# Patient Record
Sex: FEMALE | Race: WHITE
[De-identification: names, ages, dates, MRNs, and addresses within clinical notes are randomized per-mention and may not be internally consistent; named-entity substitution may affect disease eponyms.]

---

## 2018-12-31 ENCOUNTER — HOSPITAL ENCOUNTER (INPATIENT)
Dept: HOSPITAL 92 - L&D | Age: 23
LOS: 2 days | Discharge: HOME | End: 2019-01-02
Attending: OBSTETRICS & GYNECOLOGY | Admitting: OBSTETRICS & GYNECOLOGY
Payer: COMMERCIAL

## 2018-12-31 VITALS — BODY MASS INDEX: 24.1 KG/M2

## 2018-12-31 DIAGNOSIS — Z3A.40: ICD-10-CM

## 2018-12-31 DIAGNOSIS — O48.0: ICD-10-CM

## 2018-12-31 LAB
HBSAG INDEX: 0.19 S/CO (ref 0–0.99)
HGB BLD-MCNC: 13.3 G/DL (ref 12–16)
MCH RBC QN AUTO: 29.7 PG (ref 27–31)
MCV RBC AUTO: 85.4 FL (ref 78–98)
PLATELET # BLD AUTO: 194 THOU/UL (ref 130–400)
RBC # BLD AUTO: 4.46 MILL/UL (ref 4.2–5.4)
WBC # BLD AUTO: 8.1 THOU/UL (ref 4.8–10.8)

## 2018-12-31 PROCEDURE — 87340 HEPATITIS B SURFACE AG IA: CPT

## 2018-12-31 PROCEDURE — 86850 RBC ANTIBODY SCREEN: CPT

## 2018-12-31 PROCEDURE — 36415 COLL VENOUS BLD VENIPUNCTURE: CPT

## 2018-12-31 PROCEDURE — 85027 COMPLETE CBC AUTOMATED: CPT

## 2018-12-31 PROCEDURE — 51702 INSERT TEMP BLADDER CATH: CPT

## 2018-12-31 PROCEDURE — 86901 BLOOD TYPING SEROLOGIC RH(D): CPT

## 2018-12-31 PROCEDURE — 86900 BLOOD TYPING SEROLOGIC ABO: CPT

## 2018-12-31 RX ADMIN — SIMETHICONE PRN MG: 80 TABLET, CHEWABLE ORAL at 20:53

## 2018-12-31 RX ADMIN — Medication SCH: at 22:56

## 2018-12-31 RX ADMIN — DOCUSATE CALCIUM SCH MG: 240 CAPSULE, LIQUID FILLED ORAL at 20:53

## 2018-12-31 NOTE — OP
DATE OF PROCEDURE:  2018



PREOPERATIVE DIAGNOSES:  

1. A 23-year-old G2, P1 at 40 weeks and 2 days.

2. Breech presentation.

3. Declined external cephalic version.



POSTOPERATIVE DIAGNOSIS:  Status post primary low transverse  section.



PROCEDURE PERFORMED:  Primary low transverse  section.



ASSISTANT:  Dr. Arndt.



ANESTHESIA:  Spinal.



COMPLICATIONS:  None.



ESTIMATED BLOOD LOSS:  600 mL.



QUANTITATIVE BLOOD LOSS:  Pending.



FINDINGS:  

1. Female infant delivered from complete breech presentation.  Apgars pending 
at the

time of dictation.  Weight 7 pounds 5 ounces, to  Nursery. 

2. Low transverse hysterotomy without extension.

3. Normal appearing uterus, tubes, and ovaries bilaterally.

4. Placenta delivered intact, three-vessel cord.

5. Fundus firm after delivery of placenta and closure of hysterotomy.



DESCRIPTION OF PROCEDURE:  The patient was taken back to the OR with IV fluids

running.  Once she was in the OR, spinal anesthesia was obtained.  The patient 
was

then placed in dorsal supine position with a left lateral tilt.  Kim catheter 
was

placed using sterile technique, 2 g of Ancef were administered for surgical

prophylaxis and the abdomen was prepped and draped in normal fashion for 


section.  Surgeons were scrubbed in.  The anesthesia was tested and found to be

adequate.  A Pfannenstiel skin incision was made with a scalpel.  Skin incision 
was

carried down through the subcutaneous tissue to the fascia.  Once the fascia was

reached, it was incised in the midline and extended superolaterally using curved

Foster scissors.  Kocher clamps were placed at the superior border of the fascia,

which was sharply and bluntly dissected off the rectus abdominis muscles 
allowing

adequate delivery for the infant.  This step was repeated at the inferior edge 
of

the fascia down towards the level of the pubic symphysis.  The rectus muscles 
were

bluntly  in the midline.  The peritoneum was bluntly entered and 
stretched

laterally.  An Jarrett O retractor was placed into the peritoneal cavity for

retraction, visualization, and protection of the wound.  A bladder flap was 
created

and the bladder was dissected away from the planned hysterotomy site.  A

low-transverse hysterotomy was made with the scalpel.  The hysterotomy was 
bluntly

entered and stretched superolaterally using the Piña maneuver.  An amniotomy

was performed with clear fluid noted.  The infant's feet were grasped at the 
level

of the hysterotomy with gentle fundal pressure.  The lower extremities, abdomen
, and

chest spontaneously delivered, with gentle rotation, the left upper extremity

spontaneously delivered, with counter rotation, the right upper extremity

spontaneously delivered followed by a spontaneous delivery of the infant's head.

After the infant was delivered, the cord was doubly clamped and cut, the infant 
was

handed off to the special care nurses in attendance.  Cord blood was collected.
  The

placenta was delivered.  The uterus was exteriorized and massaged to firm and

cleared of clot and debris.  The hysterotomy was inspected with no extension 
noted.

The hysterotomy was closed with Monocryl suture in a running locked fashion.  
After

the hysterotomy was closed, the pericolic gutters and hysterotomy were 
irrigated and

suctioned dry.  Hysterotomy was again inspected with no bleeding noted.  The 
fundus

again was noted to be firm.  The fascia and muscles were inspected with no 
areas of

bleeding noted.  The fascia was reapproximated with PDS suture from corner to

corner.  Subcutaneous tissue was irrigated and dried.  The subcuticular tissue 
was

re-approximated with 4-0 Monocryl and dressed with Dermabond dressing.  The 
patient

tolerated the procedure well.  There were no complications. 







Job ID:  [id number]



[facility]

## 2018-12-31 NOTE — PDOC.OPDEL
OB Operative/Delivery Note


Delivery Dr/Surgeon: Jay


Assist: Lennie


Pre-Delivery Diagnosis: breech


Procedure/Post Delivery Dx: primary low transverse CS


Weeks gestation: 40


Anesthesia: spinal





- Findings


  ** A


Sex: female


Weight: 7 lb 5 oz





- Additional Findings/Plan


Placenta delivered: manual removal


 findings: low transverse hysterotomy without extension, normal uterus, 

normal tubes, normal ovaries


Estimated blood loss: 600ml


Compilations/Other Findings: 





delivered from complete  breech 


Post delivery plan: routine recovery

## 2018-12-31 NOTE — OP
DATE OF PROCEDURE:  2018



Ms. Starla Granda had a primary  performed today on 2018 with

primary surgeon as Dr. Jimmy Thompson.  I functioned as her first assist.  For complete

details, please refer to her dictated note. 







Job ID:  [id number]

## 2018-12-31 NOTE — PDOC.LDHP
Labor and Delivery H&P


Chief complaint: scheduled  section


HPI: 





Pt is a 24yo  @ 40.2 here for sched CS for breech presentation, declined 

ECV.


Current gestational age (weeks): 40


Due date: 18


Dating criteria: last menstrual period, first trimester ultrasound


Grav: 2


Para: 1


OB History Details: 





hx of 


Current pregnancy complications: none


Abnormal US findings: No (breech )


Current medications: pre- vitamins


Allergies/Adverse Reactions: 


 Allergies











Allergy/AdvReac Type Severity Reaction Status Date / Time


 


No Known Allergies Allergy   Verified 18 09:19











Social history: none





- Physical Exam


Vital signs reviewed and normal: yes


General: NAD


Heart: RRR


Lungs: CTAB


Abdomen: gravid


Extremeties: no edema


FHT: category 1





- OB Labs


Blood type: O


RH: positive


Antibody Screen: negative


HIV: negative


RPR: negative


HEPSAg: negative


1 hour GCT: negative


GBS: negative


Rubella: immune





- Plan


Plan: admit to L&D, to OR for  section, informed consent obtained, 

anesthesia consult for pain management


-: 





 @ 40 weeks here for scheduled CS for breech presentation, declined ECV.  

To OR for primary CS.

## 2019-01-01 LAB
HGB BLD-MCNC: 13 G/DL (ref 12–16)
MCH RBC QN AUTO: 29.6 PG (ref 27–31)
MCV RBC AUTO: 87.7 FL (ref 78–98)
PLATELET # BLD AUTO: 155 THOU/UL (ref 130–400)
RBC # BLD AUTO: 4.39 MILL/UL (ref 4.2–5.4)
WBC # BLD AUTO: 10.2 THOU/UL (ref 4.8–10.8)

## 2019-01-01 RX ADMIN — Medication SCH: at 10:40

## 2019-01-01 RX ADMIN — HYDROCODONE BITARTRATE AND ACETAMINOPHEN PRN TAB: 5; 325 TABLET ORAL at 10:42

## 2019-01-01 RX ADMIN — DOCUSATE CALCIUM SCH MG: 240 CAPSULE, LIQUID FILLED ORAL at 21:17

## 2019-01-01 RX ADMIN — HYDROCODONE BITARTRATE AND ACETAMINOPHEN PRN TAB: 5; 325 TABLET ORAL at 16:04

## 2019-01-01 RX ADMIN — SIMETHICONE PRN MG: 80 TABLET, CHEWABLE ORAL at 10:41

## 2019-01-01 RX ADMIN — Medication SCH: at 21:36

## 2019-01-01 RX ADMIN — DOCUSATE CALCIUM SCH MG: 240 CAPSULE, LIQUID FILLED ORAL at 09:07

## 2019-01-01 RX ADMIN — Medication SCH ML: at 04:12

## 2019-01-01 RX ADMIN — HYDROCODONE BITARTRATE AND ACETAMINOPHEN PRN TAB: 5; 325 TABLET ORAL at 06:33

## 2019-01-01 NOTE — PDOC.PP
Post Partum Progress Note


Post Partum Day #: 1


Subjective: 





doing well, nursing, eating well, ambulating to void


PO intake tolerated: yes


Flatus: yes


Ambulation: yes


 Vital Signs (12 hours)











  Temp Pulse Resp BP Pulse Ox


 


 19 07:56  98.3 F  76  20  106/63  97


 


 19 04:20  98.2 F  69  18  108/57 L 


 


 18 23:55  98.0 F  58 L  18  103/55 L 








 Weight











Weight                         154 lb

















- Physical Examination


General: NAD


Respiratory: non-labored breathing


Fundus firm & at: below umb


Extremities: negative homans (B)


Skin: CS incision dry & intact, no rash


Neurological: no gross focal deficits


Psychiatric: A&Ox3, normal affect


Result Diagrams: 


 19 06:39





Additional Labs: 


 Post Partum Labs











Blood Type  O POSITIVE   18  09:24    


 


Hep Bs Antigen  Non-Reactive S/CO (NonReactive)   18  09:24    











(1) 40 weeks gestation of pregnancy


Code(s): Z3A.40 - 40 WEEKS GESTATION OF PREGNANCY   Status: Acute   





(2) Breech presentation at birth


Code(s): O32.1XX0 - MATERNAL CARE FOR BREECH PRESENTATION, UNSP   Status: Acute

   





(3)  delivery delivered


Code(s): O82 - ENCOUNTER FOR  DELIVERY WITHOUT INDICATION   Status: 

Acute   





- Assessment/Plan





A/P: POD1 sp scheduled CS for breech presentation at 40 weeks. Doing well, 

normal expectations of breast milk production and nutritional needs of baby 

reviewed.

## 2019-01-02 VITALS — TEMPERATURE: 98 F | DIASTOLIC BLOOD PRESSURE: 57 MMHG | SYSTOLIC BLOOD PRESSURE: 108 MMHG

## 2019-01-02 RX ADMIN — SIMETHICONE PRN MG: 80 TABLET, CHEWABLE ORAL at 00:05

## 2019-01-02 RX ADMIN — HYDROCODONE BITARTRATE AND ACETAMINOPHEN PRN TAB: 5; 325 TABLET ORAL at 06:29

## 2019-01-02 RX ADMIN — DOCUSATE CALCIUM SCH MG: 240 CAPSULE, LIQUID FILLED ORAL at 09:56

## 2019-01-02 RX ADMIN — Medication SCH: at 11:52

## 2019-01-02 NOTE — PDOC.PP
Post Partum Progress Note


Post Partum Day #: 2


Subjective: 





feeling well, ready for DC, nursing well, pain controlled w po meds


PO intake tolerated: yes


Flatus: yes


Ambulation: yes


 Vital Signs (12 hours)











  Temp Pulse Resp BP BP Pulse Ox


 


 19 08:04  98.0 F  65  20   108/57 L  98


 


 19 00:00  97.8 F  60  18  109/59 L  








 Weight











Weight                         154 lb

















- Physical Examination


General: NAD


Cardiovascular: no m/r/g


Respiratory: non-labored breathing


Abdominal: no distention


Fundus firm & at: below umb


Extremities: negative homans (B)


Skin: CS incision dry & intact, no rash


Neurological: no gross focal deficits


Psychiatric: normal affect


Result Diagrams: 


 19 06:39





Additional Labs: 


 Post Partum Labs











Blood Type  O POSITIVE   18  09:24    


 


Hep Bs Antigen  Non-Reactive S/CO (NonReactive)   18  09:24    











(1) 40 weeks gestation of pregnancy


Code(s): Z3A.40 - 40 WEEKS GESTATION OF PREGNANCY   Status: Acute   





(2) Breech presentation at birth


Code(s): O32.1XX0 - MATERNAL CARE FOR BREECH PRESENTATION, UNSP   Status: Acute

   





(3)  delivery delivered


Code(s): O82 - ENCOUNTER FOR  DELIVERY WITHOUT INDICATION   Status: 

Acute   





- Assessment/Plan





POD2, doing well, plan for DC today.